# Patient Record
Sex: FEMALE | Race: BLACK OR AFRICAN AMERICAN | NOT HISPANIC OR LATINO | URBAN - METROPOLITAN AREA
[De-identification: names, ages, dates, MRNs, and addresses within clinical notes are randomized per-mention and may not be internally consistent; named-entity substitution may affect disease eponyms.]

---

## 2018-10-29 ENCOUNTER — EMERGENCY (EMERGENCY)
Facility: HOSPITAL | Age: 38
LOS: 1 days | Discharge: HOME | End: 2018-10-29
Attending: EMERGENCY MEDICINE | Admitting: EMERGENCY MEDICINE
Payer: MEDICAID

## 2018-10-29 VITALS
DIASTOLIC BLOOD PRESSURE: 83 MMHG | RESPIRATION RATE: 18 BRPM | OXYGEN SATURATION: 100 % | SYSTOLIC BLOOD PRESSURE: 130 MMHG | HEART RATE: 86 BPM

## 2018-10-29 VITALS
RESPIRATION RATE: 18 BRPM | DIASTOLIC BLOOD PRESSURE: 113 MMHG | HEART RATE: 108 BPM | OXYGEN SATURATION: 100 % | SYSTOLIC BLOOD PRESSURE: 162 MMHG | TEMPERATURE: 98 F

## 2018-10-29 DIAGNOSIS — F10.20 ALCOHOL DEPENDENCE, UNCOMPLICATED: ICD-10-CM

## 2018-10-29 LAB — HCG UR QL: NEGATIVE — SIGNIFICANT CHANGE UP

## 2018-10-29 PROCEDURE — 99283 EMERGENCY DEPT VISIT LOW MDM: CPT

## 2018-10-29 RX ADMIN — Medication 25 MILLIGRAM(S): at 11:56

## 2018-10-29 NOTE — ED PROVIDER NOTE - OBJECTIVE STATEMENT
38 yo woman w alcohol and cocaine dependance sent to ED from detox center after it was noted her bp was elevated. last drink etoh today .

## 2018-10-29 NOTE — ED ADULT TRIAGE NOTE - CHIEF COMPLAINT QUOTE
Pt. was attempting to check into Detox this morning for alcohol and cocaine, upon arrival pt. had elevated BP and tachycardia. Last drink about 3 hours ago, accompanied by use of cocaine and benadryl. Denies any chest pain, SOB, ISAAC

## 2018-10-29 NOTE — ED ADULT NURSE NOTE - CHPI ED NUR SYMPTOMS NEG
no chills/no weakness/no vomiting/no pain/no nausea/no dizziness/no fever/no decreased eating/drinking/no tingling

## 2018-10-29 NOTE — ED ADULT NURSE NOTE - OBJECTIVE STATEMENT
36 y/o male requesting med evaluation, states her BP has been fluctuating- arrives to ED from [facility] for clearance- Pt with no medical history and denies cp, sob, dizziness

## 2018-10-29 NOTE — ED PROVIDER NOTE - MEDICAL DECISION MAKING DETAILS
uncomplicated ed course. VS normalized w/o intervention   post dc pt is to return to detox center for continued care

## 2018-10-29 NOTE — SBIRT NOTE. - NSSBIRTSERVICES_GEN_A_ED_FT
Provided SBIRT services: Full screen positive. Referral to Treatment attempted. Screening results were reviewed with the patient and patient was provided information about healthy guidelines and potential negative consequences associated with level of risk. Motivation and readiness to reduce or stop use was discussed and goals and activities to make changes were suggested/offered.  Options discussed for further evaluation and treatment, but referral to treatment was not completed because Patient currently has a treatment plan setup or currently in treatment at [facility] substance abuse treatment center.   Audit Score: 22  DAST Score:0  Duration = 18 Minutes

## 2019-08-30 NOTE — ED PROVIDER NOTE - CROS ED CONS ALL NEG
decreased stride length/increased stride width/decreased weight-shifting ability/decreased sonja negative...

## 2023-05-06 NOTE — ED PROVIDER NOTE - CONSTITUTIONAL, MLM
OBSERVATION DISCHARGE normal... Well appearing, well nourished, awake, alert, oriented to person, place, time/situation and in no apparent distress.